# Patient Record
Sex: MALE | Race: WHITE | NOT HISPANIC OR LATINO | URBAN - METROPOLITAN AREA
[De-identification: names, ages, dates, MRNs, and addresses within clinical notes are randomized per-mention and may not be internally consistent; named-entity substitution may affect disease eponyms.]

---

## 2018-02-27 ENCOUNTER — OUTPATIENT (OUTPATIENT)
Dept: OUTPATIENT SERVICES | Facility: HOSPITAL | Age: 38
LOS: 1 days | Discharge: HOME | End: 2018-02-27

## 2018-02-27 VITALS
DIASTOLIC BLOOD PRESSURE: 72 MMHG | WEIGHT: 190.04 LBS | HEART RATE: 70 BPM | SYSTOLIC BLOOD PRESSURE: 126 MMHG | OXYGEN SATURATION: 98 % | RESPIRATION RATE: 12 BRPM

## 2018-02-27 VITALS
TEMPERATURE: 98 F | OXYGEN SATURATION: 98 % | HEART RATE: 70 BPM | RESPIRATION RATE: 12 BRPM | DIASTOLIC BLOOD PRESSURE: 72 MMHG | SYSTOLIC BLOOD PRESSURE: 126 MMHG

## 2018-02-27 DIAGNOSIS — I34.0 NONRHEUMATIC MITRAL (VALVE) INSUFFICIENCY: ICD-10-CM

## 2018-02-27 NOTE — PROCEDURE NOTE - PROCEDURE
<<-----Click on this checkbox to enter Procedure PRINCESS (transesophageal echocardiography)  02/27/2018    Active  [id number]

## 2018-02-27 NOTE — H&P CARDIOLOGY - PMH
Nonrheumatic mitral (valve) insufficiency    Nonrheumatic mitral valve prolapse Nonrheumatic aortic (valve) insufficiency    Nonrheumatic mitral (valve) insufficiency    Nonrheumatic mitral valve prolapse

## 2018-02-27 NOTE — PRE-ANESTHESIA EVALUATION ADULT - NSANTHOSAYNRD_GEN_A_CORE
No. NERI screening performed.  STOP BANG Legend: 0-2 = LOW Risk; 3-4 = INTERMEDIATE Risk; 5-8 = HIGH Risk

## 2018-05-02 ENCOUNTER — APPOINTMENT (OUTPATIENT)
Dept: CARDIOLOGY | Facility: CLINIC | Age: 38
End: 2018-05-02

## 2024-01-19 PROBLEM — I34.0 NONRHEUMATIC MITRAL (VALVE) INSUFFICIENCY: Chronic | Status: ACTIVE | Noted: 2018-02-27

## 2024-01-19 PROBLEM — I35.1 NONRHEUMATIC AORTIC (VALVE) INSUFFICIENCY: Chronic | Status: ACTIVE | Noted: 2018-02-27

## 2024-01-19 PROBLEM — I34.1 NONRHEUMATIC MITRAL (VALVE) PROLAPSE: Chronic | Status: ACTIVE | Noted: 2018-02-27

## 2024-02-01 ENCOUNTER — APPOINTMENT (OUTPATIENT)
Dept: CARDIOLOGY | Facility: CLINIC | Age: 44
End: 2024-02-01
Payer: COMMERCIAL

## 2024-02-01 VITALS
HEART RATE: 56 BPM | BODY MASS INDEX: 26.04 KG/M2 | DIASTOLIC BLOOD PRESSURE: 99 MMHG | HEIGHT: 71 IN | SYSTOLIC BLOOD PRESSURE: 110 MMHG | WEIGHT: 186 LBS

## 2024-02-01 PROCEDURE — 99204 OFFICE O/P NEW MOD 45 MIN: CPT | Mod: 25

## 2024-02-01 PROCEDURE — 93000 ELECTROCARDIOGRAM COMPLETE: CPT

## 2024-02-01 NOTE — ASSESSMENT
[FreeTextEntry1] : Mr. Mendez is a 43-year-old man with history of mitral valve prolapse and aortic insufficiency presenting for a second opinion regarding management of his valvular disease.  Impression: (1) MVP with mod-severe MR (2) Moderate-severe AI with LV cavity dilatation (LVEDD 7cm; prior 6.4cm; no images available for review, unsure of accuracy in measurement). Reportedly asymptomatic. (3) Family history of cardiac disease: sister with RV thrombus requiring open heart surgery, paternal relatives with premature ASCVD.  Plan: - PRINCESS (will obtain baseline TTE images during that study to ensure accurate cavity measurements and EF assessment). - Referral to [facility] to establish care. Will eventually likely require MVR and AVR. 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 Concerned about bleeding risk at work as he works with chainsaws and other sharp equipment. Also wants to continue playing basketball.  Will follow up with him after PRINCESS and [facility] evaluation. ED precautions discussed in detail with him and his sister.

## 2024-02-01 NOTE — PHYSICAL EXAM
[Well Developed] : well developed [Well Nourished] : well nourished [No Acute Distress] : no acute distress [Normal Conjunctiva] : normal conjunctiva [Normal Venous Pressure] : normal venous pressure [No Carotid Bruit] : no carotid bruit [Normal S1, S2] : normal S1, S2 [No Rub] : no rub [No Gallop] : no gallop [Clear Lung Fields] : clear lung fields [Good Air Entry] : good air entry [No Respiratory Distress] : no respiratory distress  [Soft] : abdomen soft [Non Tender] : non-tender [No Masses/organomegaly] : no masses/organomegaly [Normal Bowel Sounds] : normal bowel sounds [Normal Gait] : normal gait [No Edema] : no edema [No Cyanosis] : no cyanosis [No Clubbing] : no clubbing [No Varicosities] : no varicosities [No Rash] : no rash [No Skin Lesions] : no skin lesions [Moves all extremities] : moves all extremities [No Focal Deficits] : no focal deficits [Normal Speech] : normal speech [Alert and Oriented] : alert and oriented [Normal memory] : normal memory [de-identified] : 5/6 systolic murmur at the apex, 3/4 diastolic murmur at the RUSB

## 2024-02-01 NOTE — HISTORY OF PRESENT ILLNESS
[FreeTextEntry1] : Mr. Mendez is a 43-year-old man with history of mitral valve prolapse and aortic insufficiency presenting for a second opinion regarding management of his valvular disease.  Patient has extensive family history of cardiac disease, including two paternal aunt/uncles with history of CABG (including one at age 46) and a sister with coagulopathy (workup reportedly negative) and resultant RV thrombus requiring surgical removal.  He overall has been in good health throughout his life. Plays basketball occasionally, but notes that recently he became very short of breath and had to stop because "I always push myself too hard." No chest pain, leg swelling, or PND. He does get rare episodes of PND, maybe once a month.  Many years ago he was diagnosed with mitral valve prolapse and aortic insufficiency. He recently underwent TTE at [facility], showing now moderate-severe aortic insufficiency with severe LV cavity dilatation.  TTE 2016 - Normal systolic function, normal RV function, moderate AI, mild TR, mild OK, anterior and posterior mitral prolapse TTE 1/2024 - Severely dilated LV cavity (LVEDD 7cm [prior 1/2022 6.4cm, 2016 5.7cm), LVESD 4.9cm) with EF 57%, mild LA dilatation, moderate RA dilatation, mild MVP of the posterior leaflet with moderate-severe MR, trileaflet AV with mod-severe AI and apparent perforation of the non-coronary cusp, PASP 35mmHg, AoRoot 4.1cm  Labs: - , HDL 65, TG 66, , non- - Cr 0.8, K 4.4, normal transaminases - TSH 2.08, A1c 5.4%

## 2024-02-02 LAB
ALBUMIN SERPL ELPH-MCNC: 4.9 G/DL
ALP BLD-CCNC: 66 U/L
ALT SERPL-CCNC: 23 U/L
ANION GAP SERPL CALC-SCNC: 12 MMOL/L
APTT BLD: 35.1 SEC
AST SERPL-CCNC: 20 U/L
BILIRUB SERPL-MCNC: 0.4 MG/DL
BUN SERPL-MCNC: 18 MG/DL
CALCIUM SERPL-MCNC: 9.6 MG/DL
CHLORIDE SERPL-SCNC: 102 MMOL/L
CO2 SERPL-SCNC: 27 MMOL/L
CREAT SERPL-MCNC: 0.8 MG/DL
EGFR: 111 ML/MIN/1.73M2
GLUCOSE SERPL-MCNC: 92 MG/DL
HCT VFR BLD CALC: 46.2 %
HGB BLD-MCNC: 15.3 G/DL
INR PPP: 0.97 RATIO
MCHC RBC-ENTMCNC: 29.9 PG
MCHC RBC-ENTMCNC: 33.1 G/DL
MCV RBC AUTO: 90.4 FL
NT-PROBNP SERPL-MCNC: 39 PG/ML
PLATELET # BLD AUTO: 195 K/UL
PMV BLD AUTO: 0 /100 WBCS
PMV BLD: 10 FL
POTASSIUM SERPL-SCNC: 4.8 MMOL/L
PROT SERPL-MCNC: 7 G/DL
PT BLD: 11.1 SEC
RBC # BLD: 5.11 M/UL
RBC # FLD: 12.6 %
SODIUM SERPL-SCNC: 141 MMOL/L
WBC # FLD AUTO: 7.13 K/UL

## 2024-02-06 ENCOUNTER — OUTPATIENT (OUTPATIENT)
Dept: OUTPATIENT SERVICES | Facility: HOSPITAL | Age: 44
LOS: 1 days | End: 2024-02-06
Payer: COMMERCIAL

## 2024-02-06 DIAGNOSIS — I35.1 NONRHEUMATIC AORTIC (VALVE) INSUFFICIENCY: ICD-10-CM

## 2024-02-06 PROCEDURE — 93312 ECHO TRANSESOPHAGEAL: CPT | Mod: 26,XU

## 2024-02-06 PROCEDURE — 93325 DOPPLER ECHO COLOR FLOW MAPG: CPT | Mod: 26

## 2024-02-06 PROCEDURE — 93320 DOPPLER ECHO COMPLETE: CPT

## 2024-02-06 PROCEDURE — 93325 DOPPLER ECHO COLOR FLOW MAPG: CPT

## 2024-02-06 PROCEDURE — 93320 DOPPLER ECHO COMPLETE: CPT | Mod: 26

## 2024-02-06 PROCEDURE — 93312 ECHO TRANSESOPHAGEAL: CPT

## 2024-02-06 NOTE — CHART NOTE - NSCHARTNOTEFT_GEN_A_CORE
POST OPERATIVE PROCEDURAL DOCUMENTATION  PRE-OP DIAGNOSIS:  MR    POST-OP DIAGNOSIS:  Severe MR, severe AI    PROCEDURE: Transesophageal echocardiogram    Primary Physician:  Dr. Orta  Assistant: Dr. Saul    ANESTHESIA TYPE  [  ] Sedation as per documentation from Anesthesia    CONDITION  [  ] Critical  [  ] Serious  [  ] Fair  [ x ] Good    SPECIMENS REMOVED (IF APPLICABLE): N/A    IMPLANTS (IF APPLICABLE): None    ESTIMATED BLOOD LOSS: None    COMPLICATIONS: None      FINDINGS:    After risks and benefits of procedures were explained, informed consent was obtained and placed in chart. Refer to Anesthesia note for sedation details.  The PRINCESS probe was passed into the esophagus without difficulty.  Transesophageal and transgastric images were obtained.  The PRINCESS probe was removed without difficulty and examined.  There was no evidence for bleeding.  The patient tolerated the procedure well without any immediate PRINCESS-related complications.      Preliminary Findings:  LA:  mild dilatation  JULIOCESAR: Left atrial appendage was clear of clot and smoke.  LV: LVEF normal  MV: severe MR eccentric jet difficult to quantify, apparent P2/P3 prolapse based on 3D, no evidence of MS.   AV: severe AI, trileaflet, no evidence of AS.   RA: normal  TV: trace TR.   PV: no PI.   IAS: no PFO. No R-> L shunt.   Aorta:  simple atheroma of aortic arch / desc aorta    DIAGNOSIS/IMPRESSION:    PLAN OF CARE:  DC home  f/u with cardiologist POST OPERATIVE PROCEDURAL DOCUMENTATION  PRE-OP DIAGNOSIS:  MR    POST-OP DIAGNOSIS:  Severe MR, severe AI    PROCEDURE: Transesophageal echocardiogram    Primary Physician:  Dr. Orta  Assistant: Dr. Saul    ANESTHESIA TYPE  [  ] Sedation as per documentation from Anesthesia    CONDITION  [  ] Critical  [  ] Serious  [  ] Fair  [ x ] Good    SPECIMENS REMOVED (IF APPLICABLE): N/A    IMPLANTS (IF APPLICABLE): None    ESTIMATED BLOOD LOSS: None    COMPLICATIONS: None      FINDINGS:    After risks and benefits of procedures were explained, informed consent was obtained and placed in chart. Refer to Anesthesia note for sedation details.  The PRINCESS probe was passed into the esophagus without difficulty.  Transesophageal and transgastric images were obtained.  The PRINCESS probe was removed without difficulty and examined.  There was no evidence for bleeding.  The patient tolerated the procedure well without any immediate PRINCESS-related complications.      Preliminary Findings:  LA:  mild dilatation  JULIOCESAR: Left atrial appendage was clear of clot and smoke.  LV: LVEF normal  MV: severe MR eccentric jet difficult to quantify, apparent P2/P3 prolapse based on 3D, no evidence of MS.   AV: severe AI, trileaflet, no evidence of AS.   RA: normal  TV: trace TR.   PV: no PI.   IAS: no PFO. No R-> L shunt.   Aorta:  simple atheroma of aortic arch / desc aorta    DIAGNOSIS/IMPRESSION:    PLAN OF CARE:  DC home  f/u with cardiologist    Attending Addendum:  Severe MR, prolapse of P2 and P3.  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  See full report for details.  Patient to follow up with [facility]TS[facility] 2/8/24.

## 2024-02-06 NOTE — ASU PATIENT PROFILE, ADULT - NSICDXPASTMEDICALHX_GEN_ALL_CORE_FT
PAST MEDICAL HISTORY:  Nonrheumatic aortic (valve) insufficiency     Nonrheumatic mitral (valve) insufficiency     Nonrheumatic mitral valve prolapse

## 2024-02-07 DIAGNOSIS — I35.1 NONRHEUMATIC AORTIC (VALVE) INSUFFICIENCY: ICD-10-CM

## 2024-02-08 ENCOUNTER — APPOINTMENT (OUTPATIENT)
Dept: CARDIOTHORACIC SURGERY | Facility: CLINIC | Age: 44
End: 2024-02-08
Payer: COMMERCIAL

## 2024-02-08 VITALS
SYSTOLIC BLOOD PRESSURE: 150 MMHG | HEART RATE: 82 BPM | RESPIRATION RATE: 14 BRPM | OXYGEN SATURATION: 96 % | WEIGHT: 185 LBS | BODY MASS INDEX: 25.9 KG/M2 | DIASTOLIC BLOOD PRESSURE: 76 MMHG | HEIGHT: 71 IN

## 2024-02-08 PROCEDURE — 99203 OFFICE O/P NEW LOW 30 MIN: CPT

## 2024-02-08 NOTE — PHYSICAL EXAM
[General Appearance - Alert] : alert [General Appearance - In No Acute Distress] : in no acute distress [Sclera] : the sclera and conjunctiva were normal [Outer Ear] : the ears and nose were normal in appearance [Neck Appearance] : the appearance of the neck was normal [Respiration, Rhythm And Depth] : normal respiratory rhythm and effort [Heart Rate And Rhythm] : heart rate was normal and rhythm regular [Examination Of The Chest] : the chest was normal in appearance [Bowel Sounds] : normal bowel sounds [Abdomen Soft] : soft [No CVA Tenderness] : no ~M costovertebral angle tenderness [Involuntary Movements] : no involuntary movements were seen [Skin Color & Pigmentation] : normal skin color and pigmentation [Skin Turgor] : normal skin turgor [No Focal Deficits] : no focal deficits [Oriented To Time, Place, And Person] : oriented to person, place, and time [Impaired Insight] : insight and judgment were intact

## 2024-02-12 NOTE — HISTORY OF PRESENT ILLNESS
[FreeTextEntry1] : Mr. CLAUDY MORGAN 43 year M, social cigar smoker, here  today for evaluation of their moderate to severe AI and severe mitral regurgitation. PMHx of MR and AI found during physical done in college.  Denies any symptoms.  All questions and concerns were addressed with the patient. Pre-op planning was discussed with the patient, including dental clearance. Patient was educated on the risks and alternatives to surgery. STS was calculated and discussed with the pt  NYHA class: I  CCS class: I Pt lives home wife - no aide  Plays basketball, [employer] as  owns company  Denies family history of CAD  Their healthcare team includes the following PMD: [place]  Cardio: You (formerly [facility])   5 Meter walk 1. 2.8 2. 3.0 3. 2.7

## 2024-02-12 NOTE — ASSESSMENT
[FreeTextEntry1] : Mr. CLAUDY MORGAN 43 year M, social cigar smoker, here  today for evaluation of their moderate to severe AI and severe lida regurgitation.   PRINCESS reviewed Discussed transcatheter approach as well are surgical valve replacement - AI not suitable for TAVR Would benefit from surgical replacement AVR and MR repair  Discussed tissue valve vs mechanical AVR - lifelong AC, as well as life expectancy of tissue valve vs mechanical valve  and likely need for future valve replacement with either transcatheter approach or reop.   At this time - d/t job - pt looking to defer surgery until 12/2024 Advised to discuss with his cardio Recommend repeat echo in 3-6 months with cardio F/U CTS after echo or sooner if develops symptoms or wants surgery sooner   Patient seen and examined History and physical as per above Briefly, patient with severe AI and MR with dilated left ventricle. Meets class I indications for mitral repair and aortic valve repair, possible replace Patient concerned about timing of surgery and will call the office to discuss when he would like to schedule surgery  I, Dr. Byers, saw, examined, and reviewed the diagnostic images with the patient and agree with my nurse practitioners clinic note, physical exam findings, and treatment plan.  Darron Byers MD

## 2024-05-14 ENCOUNTER — APPOINTMENT (OUTPATIENT)
Dept: CARDIOLOGY | Facility: CLINIC | Age: 44
End: 2024-05-14
Payer: COMMERCIAL

## 2024-05-14 VITALS
WEIGHT: 179 LBS | BODY MASS INDEX: 25.06 KG/M2 | HEART RATE: 84 BPM | DIASTOLIC BLOOD PRESSURE: 80 MMHG | SYSTOLIC BLOOD PRESSURE: 100 MMHG | HEIGHT: 71 IN

## 2024-05-14 PROCEDURE — 99214 OFFICE O/P EST MOD 30 MIN: CPT | Mod: 25

## 2024-05-14 PROCEDURE — 93000 ELECTROCARDIOGRAM COMPLETE: CPT

## 2024-05-14 RX ORDER — ASPIRIN 81 MG/1
81 TABLET ORAL DAILY
Refills: 0 | Status: ACTIVE | COMMUNITY

## 2024-05-14 NOTE — HISTORY OF PRESENT ILLNESS
[FreeTextEntry1] : Mr. Mendez is a 44-year-old man with history of mitral valve prolapse and aortic insufficiency s/p surgery at [facility] presenting for follow up.  Patient has extensive family history of cardiac disease, including two paternal aunt/uncles with history of CABG (including one at age 46) and a sister with coagulopathy (workup reportedly negative) and resultant RV thrombus requiring surgical removal.  He overall has been in good health throughout his life. Plays basketball occasionally, but notes that recently he becomes very short of breath and has to stop because "I always push myself too hard." No chest pain, leg swelling, or PND. He does get rare episodes of PND, maybe once a month.  Many years ago he was diagnosed with mitral valve prolapse and aortic insufficiency. He recently underwent TTE at [facility], showing now moderate-severe aortic insufficiency with severe LV cavity dilatation.  Here underwent PRINCESS demonstrating severe MR 2/2 posterior leaflet prolapse and mod-severe AI with LV dilatation.  He underwent surgery at [facility] 4/1/24: AVR-t 29mm Magna, MV repair 34 CGFB Immediately after discharge (POD3) went back to work against the surgeons advice. Continues to work as a . No chest pain, dyspnea, or other complaints.   TTE 2016 - Normal systolic function, normal RV function, moderate AI, mild TR, mild OK, anterior and posterior mitral prolapse TTE 1/2024 - Severely dilated LV cavity (LVEDD 7cm [prior 1/2022 6.4cm, 2016 5.7cm), LVESD 4.9cm) with EF 57%, mild LA dilatation, moderate RA dilatation, mild MVP of the posterior leaflet with moderate-severe MR, trileaflet AV with mod-severe AI and apparent perforation of the non-coronary cusp, PASP 35mmHg, AoRoot 4.1cm PRINCESS 2/2024 - Severe prolapse of P2/P3 resulting in severe eccentric anteriorly directed MR. Myxomatous trileaflet aortic valve with moderate-severe eccentric posteriorly directed AR.  Labs: - , HDL 65, TG 66, , non- - Cr 0.8, K 4.4, normal transaminases - TSH 2.08, A1c 5.4%

## 2024-05-14 NOTE — ASSESSMENT
[FreeTextEntry1] : Mr. Mendez is a 43-year-old man with history of mitral valve prolapse and aortic insufficiency presenting for a second opinion regarding management of his valvular disease.  Impression: (1) MVP with mod-severe MR and moderate-severe AI with LV cavity dilatation; s/p AVR 29mm Magna and MV repair 34mm at [facility] 4/1/24. (2) Family history of cardiac disease: sister with RV thrombus requiring open heart surgery, paternal relatives with premature ASCVD.  Plan: - TTE - Labs - Discussed extensively risks of working immediately in the post-op period including sternal injury and need for possible sternal intervention. - Offered cardiac rehab. Patient declined because he has no time. - Lifelong aspirin - Obtain ischemic workup from [facility] (likely had a CCTA)  RTC 1 month, sooner as needed

## 2024-05-14 NOTE — PHYSICAL EXAM
[Well Developed] : well developed [Well Nourished] : well nourished [No Acute Distress] : no acute distress [Normal Conjunctiva] : normal conjunctiva [Normal Venous Pressure] : normal venous pressure [No Carotid Bruit] : no carotid bruit [Normal S1, S2] : normal S1, S2 [No Rub] : no rub [No Gallop] : no gallop [Clear Lung Fields] : clear lung fields [Good Air Entry] : good air entry [No Respiratory Distress] : no respiratory distress  [Soft] : abdomen soft [Non Tender] : non-tender [No Masses/organomegaly] : no masses/organomegaly [Normal Bowel Sounds] : normal bowel sounds [Normal Gait] : normal gait [No Edema] : no edema [No Cyanosis] : no cyanosis [No Clubbing] : no clubbing [No Varicosities] : no varicosities [No Rash] : no rash [No Skin Lesions] : no skin lesions [Moves all extremities] : moves all extremities [No Focal Deficits] : no focal deficits [Normal Speech] : normal speech [Alert and Oriented] : alert and oriented [Normal memory] : normal memory [de-identified] : 5/6 systolic murmur at the apex, 3/4 diastolic murmur at the RUSB

## 2024-05-15 LAB
ESTIMATED AVERAGE GLUCOSE: 111 MG/DL
HBA1C MFR BLD HPLC: 5.5 %
HCT VFR BLD CALC: 42.4 %
HGB BLD-MCNC: 13.7 G/DL
MCHC RBC-ENTMCNC: 28.7 PG
MCHC RBC-ENTMCNC: 32.3 G/DL
MCV RBC AUTO: 88.7 FL
PLATELET # BLD AUTO: 220 K/UL
PMV BLD AUTO: 0 /100 WBCS
PMV BLD: 10 FL
RBC # BLD: 4.78 M/UL
RBC # FLD: 13.1 %
WBC # FLD AUTO: 5.49 K/UL

## 2024-05-16 LAB
ALBUMIN SERPL ELPH-MCNC: 4.6 G/DL
ALP BLD-CCNC: 79 U/L
ALT SERPL-CCNC: 25 U/L
ANION GAP SERPL CALC-SCNC: 13 MMOL/L
AST SERPL-CCNC: 24 U/L
BILIRUB SERPL-MCNC: 0.2 MG/DL
BUN SERPL-MCNC: 13 MG/DL
CALCIUM SERPL-MCNC: 9.4 MG/DL
CHLORIDE SERPL-SCNC: 106 MMOL/L
CHOLEST SERPL-MCNC: 222 MG/DL
CO2 SERPL-SCNC: 24 MMOL/L
CREAT SERPL-MCNC: 0.8 MG/DL
EGFR: 111 ML/MIN/1.73M2
GLUCOSE SERPL-MCNC: 98 MG/DL
HDLC SERPL-MCNC: 52 MG/DL
LDLC SERPL CALC-MCNC: 149 MG/DL
NONHDLC SERPL-MCNC: 170 MG/DL
POTASSIUM SERPL-SCNC: 4.4 MMOL/L
PROT SERPL-MCNC: 7 G/DL
SODIUM SERPL-SCNC: 143 MMOL/L
TRIGL SERPL-MCNC: 107 MG/DL

## 2024-05-29 RX ORDER — METOPROLOL SUCCINATE 50 MG/1
50 TABLET, EXTENDED RELEASE ORAL DAILY
Qty: 90 | Refills: 3 | Status: ACTIVE | COMMUNITY
Start: 1900-01-01 | End: 1900-01-01

## 2024-06-05 ENCOUNTER — APPOINTMENT (OUTPATIENT)
Dept: CARDIOLOGY | Facility: CLINIC | Age: 44
End: 2024-06-05
Payer: COMMERCIAL

## 2024-06-05 PROCEDURE — 93306 TTE W/DOPPLER COMPLETE: CPT

## 2024-06-12 ENCOUNTER — APPOINTMENT (OUTPATIENT)
Dept: CARDIOLOGY | Facility: CLINIC | Age: 44
End: 2024-06-12
Payer: COMMERCIAL

## 2024-06-12 VITALS
HEART RATE: 77 BPM | HEIGHT: 71 IN | BODY MASS INDEX: 24.64 KG/M2 | SYSTOLIC BLOOD PRESSURE: 102 MMHG | DIASTOLIC BLOOD PRESSURE: 80 MMHG | WEIGHT: 176 LBS

## 2024-06-12 DIAGNOSIS — Z00.00 ENCOUNTER FOR GENERAL ADULT MEDICAL EXAMINATION W/OUT ABNORMAL FINDINGS: ICD-10-CM

## 2024-06-12 DIAGNOSIS — I34.1 NONRHEUMATIC MITRAL (VALVE) PROLAPSE: ICD-10-CM

## 2024-06-12 DIAGNOSIS — I35.1 NONRHEUMATIC AORTIC (VALVE) INSUFFICIENCY: ICD-10-CM

## 2024-06-12 PROCEDURE — 93000 ELECTROCARDIOGRAM COMPLETE: CPT

## 2024-06-12 PROCEDURE — 99214 OFFICE O/P EST MOD 30 MIN: CPT | Mod: 25

## 2024-06-19 NOTE — ASSESSMENT
[FreeTextEntry1] : Mr. Mendez is a 43-year-old man with history of mitral valve prolapse and aortic insufficiency presenting for a second opinion regarding management of his valvular disease.  Impression: (1) MVP with mod-severe MR and moderate-severe AI with LV cavity dilatation; s/p AVR 29mm Magna and MV repair 34mm at [facility] 4/1/24. (2) Family history of cardiac disease: sister with RV thrombus requiring open heart surgery, paternal relatives with premature ASCVD. (3) Non-obstructive CAD per [facility] CCTA report  Plan: - Discussed extensively risks of working immediately in the post-op period including sternal injury and need for possible sternal intervention. Patient states he cannot skip work and is willing to take on the risks. Again advised against doing so. - Offered cardiac rehab. Patient declined because he has no time. - Lifelong aspirin - Recommended initiation of statin therapy to target LDL <70. He is not amenable to starting therapy today but wishes to consider.  RTC 3-6 months, sooner as needed

## 2024-06-19 NOTE — HISTORY OF PRESENT ILLNESS
[FreeTextEntry1] : Mr. Mendez is a 44-year-old man with history of mitral valve prolapse and aortic insufficiency s/p surgery at [facility] presenting for follow up.  Patient has extensive family history of cardiac disease, including two paternal aunt/uncles with history of CABG (including one at age 46) and a sister with coagulopathy (workup reportedly negative) and resultant RV thrombus requiring surgical removal.  He overall has been in good health throughout his life. Plays basketball occasionally, but notes that recently he becomes very short of breath and has to stop because "I always push myself too hard." No chest pain, leg swelling, or PND. He does get rare episodes of PND, maybe once a month.  Many years ago he was diagnosed with mitral valve prolapse and aortic insufficiency. He recently underwent TTE at [facility], showing now moderate-severe aortic insufficiency with severe LV cavity dilatation.  Here underwent PRINCESS demonstrating severe MR 2/2 posterior leaflet prolapse and mod-severe AI with LV dilatation. He underwent surgery at [facility] 4/1/24: AVR-t 29mm Magna, MV repair 34 CGFB Immediately after discharge (POD3) went back to work against the surgeons advice. Continues to work as a . No chest pain, dyspnea, or other complaints.   TTE 2016 - Normal systolic function, normal RV function, moderate AI, mild TR, mild MD, anterior and posterior mitral prolapse TTE 1/2024 - Severely dilated LV cavity (LVEDD 7cm [prior 1/2022 6.4cm, 2016 5.7cm), LVESD 4.9cm) with EF 57%, mild LA dilatation, moderate RA dilatation, mild MVP of the posterior leaflet with moderate-severe MR, trileaflet AV with mod-severe AI and apparent perforation of the non-coronary cusp, PASP 35mmHg, AoRoot 4.1cm PRINCESS 2/2024 - Severe prolapse of P2/P3 resulting in severe eccentric anteriorly directed MR. Myxomatous trileaflet aortic valve with moderate-severe eccentric posteriorly directed AR.  CCTA at [facility]: mild mLAD calcification with diffuse non-obstructive CAD  Labs: - , HDL 65, TG 66, , non- - Cr 0.8, K 4.4, normal transaminases - TSH 2.08, A1c 5.4%

## 2024-06-19 NOTE — PHYSICAL EXAM
[Well Developed] : well developed [Well Nourished] : well nourished [No Acute Distress] : no acute distress [Normal Conjunctiva] : normal conjunctiva [Normal Venous Pressure] : normal venous pressure [No Carotid Bruit] : no carotid bruit [Normal S1, S2] : normal S1, S2 [No Rub] : no rub [No Gallop] : no gallop [Clear Lung Fields] : clear lung fields [Good Air Entry] : good air entry [No Respiratory Distress] : no respiratory distress  [Soft] : abdomen soft [Non Tender] : non-tender [No Masses/organomegaly] : no masses/organomegaly [Normal Bowel Sounds] : normal bowel sounds [Normal Gait] : normal gait [No Edema] : no edema [No Cyanosis] : no cyanosis [No Clubbing] : no clubbing [No Varicosities] : no varicosities [No Rash] : no rash [No Skin Lesions] : no skin lesions [Moves all extremities] : moves all extremities [No Focal Deficits] : no focal deficits [Normal Speech] : normal speech [Alert and Oriented] : alert and oriented [Normal memory] : normal memory [de-identified] : 5/6 systolic murmur at the apex, 3/4 diastolic murmur at the RUSB

## 2024-12-12 ENCOUNTER — APPOINTMENT (OUTPATIENT)
Dept: CARDIOLOGY | Facility: CLINIC | Age: 44
End: 2024-12-12
Payer: COMMERCIAL

## 2024-12-12 ENCOUNTER — TRANSCRIPTION ENCOUNTER (OUTPATIENT)
Age: 44
End: 2024-12-12

## 2024-12-12 VITALS
HEART RATE: 74 BPM | BODY MASS INDEX: 25.34 KG/M2 | WEIGHT: 181 LBS | HEIGHT: 71 IN | DIASTOLIC BLOOD PRESSURE: 80 MMHG | SYSTOLIC BLOOD PRESSURE: 125 MMHG

## 2024-12-12 DIAGNOSIS — I25.10 ATHEROSCLEROTIC HEART DISEASE OF NATIVE CORONARY ARTERY W/OUT ANGINA PECTORIS: ICD-10-CM

## 2024-12-12 DIAGNOSIS — I34.1 NONRHEUMATIC MITRAL (VALVE) PROLAPSE: ICD-10-CM

## 2024-12-12 DIAGNOSIS — I35.1 NONRHEUMATIC AORTIC (VALVE) INSUFFICIENCY: ICD-10-CM

## 2024-12-12 PROCEDURE — 99214 OFFICE O/P EST MOD 30 MIN: CPT | Mod: 25

## 2024-12-12 PROCEDURE — 93000 ELECTROCARDIOGRAM COMPLETE: CPT

## 2025-04-15 NOTE — H&P CARDIOLOGY - REVIEW OF SYSTEMS
Negative except above.
Assistance OOB with selected safe patient handling equipment if applicable/Assistance with ambulation/Communicate fall risk and risk factors to all staff, patient, and family/Monitor gait and stability/Provide patient with walking aids/Provide visual cue: yellow wristband, yellow gown, etc/Reinforce activity limits and safety measures with patient and family/Call bell, personal items and telephone in reach/Instruct patient to call for assistance before getting out of bed/chair/stretcher/Non-slip footwear applied when patient is off stretcher/[place] to call system/Physically safe environment - no spills, clutter or unnecessary equipment/Purposeful Proactive Rounding/Room/bathroom lighting operational, light cord in reach

## 2025-05-28 ENCOUNTER — APPOINTMENT (OUTPATIENT)
Dept: CARDIOLOGY | Facility: CLINIC | Age: 45
End: 2025-05-28
Payer: COMMERCIAL

## 2025-05-28 PROCEDURE — 93306 TTE W/DOPPLER COMPLETE: CPT

## 2025-06-17 ENCOUNTER — APPOINTMENT (OUTPATIENT)
Dept: CARDIOLOGY | Facility: CLINIC | Age: 45
End: 2025-06-17
Payer: COMMERCIAL

## 2025-06-17 VITALS
BODY MASS INDEX: 24.5 KG/M2 | HEART RATE: 73 BPM | SYSTOLIC BLOOD PRESSURE: 116 MMHG | DIASTOLIC BLOOD PRESSURE: 60 MMHG | HEIGHT: 71 IN | WEIGHT: 175 LBS

## 2025-06-17 PROCEDURE — 99214 OFFICE O/P EST MOD 30 MIN: CPT | Mod: 25

## 2025-06-17 PROCEDURE — 93000 ELECTROCARDIOGRAM COMPLETE: CPT

## 2025-06-17 RX ORDER — ROSUVASTATIN CALCIUM 5 MG/1
5 TABLET, FILM COATED ORAL
Qty: 90 | Refills: 3 | Status: ACTIVE | COMMUNITY
Start: 2025-06-17 | End: 1900-01-01